# Patient Record
Sex: MALE | Race: OTHER | NOT HISPANIC OR LATINO
[De-identification: names, ages, dates, MRNs, and addresses within clinical notes are randomized per-mention and may not be internally consistent; named-entity substitution may affect disease eponyms.]

---

## 2017-09-27 ENCOUNTER — APPOINTMENT (OUTPATIENT)
Dept: SURGERY | Facility: CLINIC | Age: 42
End: 2017-09-27

## 2020-01-02 ENCOUNTER — APPOINTMENT (OUTPATIENT)
Dept: SURGERY | Facility: CLINIC | Age: 45
End: 2020-01-02
Payer: COMMERCIAL

## 2020-01-02 VITALS
TEMPERATURE: 98.4 F | WEIGHT: 260 LBS | SYSTOLIC BLOOD PRESSURE: 146 MMHG | HEART RATE: 87 BPM | OXYGEN SATURATION: 98 % | RESPIRATION RATE: 16 BRPM | BODY MASS INDEX: 33.37 KG/M2 | DIASTOLIC BLOOD PRESSURE: 86 MMHG | HEIGHT: 74 IN

## 2020-01-02 DIAGNOSIS — K62.5 HEMORRHAGE OF ANUS AND RECTUM: ICD-10-CM

## 2020-01-02 DIAGNOSIS — K64.1 SECOND DEGREE HEMORRHOIDS: ICD-10-CM

## 2020-01-02 PROCEDURE — 46600 DIAGNOSTIC ANOSCOPY SPX: CPT

## 2020-01-02 PROCEDURE — 99203 OFFICE O/P NEW LOW 30 MIN: CPT | Mod: 25

## 2020-01-02 NOTE — CONSULT LETTER
[Dear  ___] : Dear ~ROCIO, [Please see my note below.] : Please see my note below. [Consult Closing:] : Thank you very much for allowing me to participate in the care of this patient.  If you have any questions, please do not hesitate to contact me. [Consult Letter:] : I had the pleasure of evaluating your patient, [unfilled]. [Sincerely,] : Sincerely, [FreeTextEntry3] : Peter Araujo M.D., JENNY.JASON., F.SAPNA.S.LESLIERReS.\[facility] Chief Colorectal Clinical Services, [facility] [FreeTextEntry2] : Dr. Jennifer Vides [DrRe  ___] : Dr. MARC

## 2020-01-02 NOTE — PHYSICAL EXAM
[Normal Breath Sounds] : Normal breath sounds [Normal Rate and Rhythm] : normal rate and rhythm [Normal Heart Sounds] : normal heart sounds [No Edema] : No edema [No Rash or Lesion] : No rash or lesion [Oriented to Place] : oriented to place [Oriented to Person] : oriented to person [Alert] : alert [Calm] : calm [Oriented to Time] : oriented to time [Abdomen Tenderness] : ~T No ~M abdominal tenderness [JVD] : no jugular venous distention  [Abdomen Masses] : No abdominal masses [Wheezing] : no wheezing was heard [Thyroid] : the thyroid was abnormal [de-identified] : Full ROM [de-identified] : WNL [de-identified] : Appears well nourished [FreeTextEntry1] : Perianal inspection unremarkable. Large internal hemorrhoids noted on both digital exam and anoscopy. No sphincter spasm and no fissure noted.

## 2020-01-02 NOTE — HISTORY OF PRESENT ILLNESS
[FreeTextEntry1] : Colonoscopy from 1/24/18 demonstrated 6 mm polyp in the transverse colon. Pathology: mucosal neuroma. Internal hemorrhoids. Patient had RBL of the left lateral hemorrhoid on 4/9/12.\par Patient had a hard BM 2 weeks ago and experienced moderate amount of BRB with sharp pains. Pain and bleeding subsided in the past few days. Occasionally feels prolapsed rectal tissue with BMs that he can manually reduce. 1 hard-normal formed BM almost daily. Patient takes daily Metamucil which typically controls his hemorrhoid symptoms. Recently he stopped taking it.

## 2020-01-02 NOTE — ASSESSMENT
[FreeTextEntry1] : I have seen and evaluated patient and I have corroborated all nursing input into this note. Patient with recent episode of constipation after discontinuing daily Metamucil. He then had hemorrhoid swelling and bleeding. These symptoms have resolved since the patient restarted Metamucil. The patient will continue Metamucil daily and if he has recurrent symptoms he will return to my office for internal hemorrhoid rubber band ligation. Indications, risks, benefits, alternatives reviewed including but not limited to bleeding, infection, and failure. All questions answered.

## 2020-09-29 ENCOUNTER — APPOINTMENT (OUTPATIENT)
Dept: COLORECTAL SURGERY | Facility: CLINIC | Age: 45
End: 2020-09-29
Payer: COMMERCIAL

## 2020-09-29 VITALS
BODY MASS INDEX: 32.73 KG/M2 | RESPIRATION RATE: 15 BRPM | SYSTOLIC BLOOD PRESSURE: 118 MMHG | HEIGHT: 74 IN | OXYGEN SATURATION: 96 % | DIASTOLIC BLOOD PRESSURE: 80 MMHG | WEIGHT: 255 LBS | TEMPERATURE: 97.5 F | HEART RATE: 74 BPM

## 2020-09-29 PROCEDURE — 99203 OFFICE O/P NEW LOW 30 MIN: CPT

## 2020-09-29 NOTE — HISTORY OF PRESENT ILLNESS
[FreeTextEntry1] : Patient is a 44 yo WM here with complaints of an umbilical hernia.  he states that it happened about a month ago while straining for a bowel movement.  He states that he has issues with straining and usually takes a fiber supplement but had missed a few doses prior to this instance.  Currently denies abdominal pain or change in bowel habits. No lump or prolapse noted at hernia site. No nausea or vomiting. No aggravating factors

## 2020-09-29 NOTE — ASSESSMENT
[FreeTextEntry1] : Umbilical hernia\par -I discussed treatment options for the patient's umbilical hernia at length. This included observation and surgical repair. Given its symptoms have started, the natural history is likely progressive discomfort in the area. We discussed surgical repair of length including risks and benefits such as bleeding, infection and possible need for mesh implantation.\par -All questions were answered\par -Patient wishes to schedule and we'll do so at his earliest convenience

## 2020-09-29 NOTE — PHYSICAL EXAM
[Normal Breath Sounds] : Normal breath sounds [Normal Heart Sounds] : normal heart sounds [Normal Rate and Rhythm] : normal rate and rhythm [No Rash or Lesion] : No rash or lesion [Alert] : alert [Oriented to Person] : oriented to person [Oriented to Place] : oriented to place [Oriented to Time] : oriented to time [Calm] : calm [de-identified] : tender abdomen, Obese, +bs [de-identified] : well nourished male [de-identified] : NC/AT [de-identified] : SARAH/+ROM [de-identified] : Intact

## 2020-10-01 ENCOUNTER — OUTPATIENT (OUTPATIENT)
Dept: OUTPATIENT SERVICES | Facility: HOSPITAL | Age: 45
LOS: 1 days | End: 2020-10-01

## 2020-10-01 VITALS
HEIGHT: 74 IN | RESPIRATION RATE: 20 BRPM | HEART RATE: 70 BPM | TEMPERATURE: 97 F | WEIGHT: 250 LBS | SYSTOLIC BLOOD PRESSURE: 120 MMHG | OXYGEN SATURATION: 98 % | DIASTOLIC BLOOD PRESSURE: 80 MMHG

## 2020-10-01 DIAGNOSIS — Z98.890 OTHER SPECIFIED POSTPROCEDURAL STATES: Chronic | ICD-10-CM

## 2020-10-01 DIAGNOSIS — Z87.19 PERSONAL HISTORY OF OTHER DISEASES OF THE DIGESTIVE SYSTEM: ICD-10-CM

## 2020-10-01 DIAGNOSIS — K43.9 VENTRAL HERNIA WITHOUT OBSTRUCTION OR GANGRENE: ICD-10-CM

## 2020-10-01 DIAGNOSIS — Z01.818 ENCOUNTER FOR OTHER PREPROCEDURAL EXAMINATION: ICD-10-CM

## 2020-10-01 DIAGNOSIS — G47.33 OBSTRUCTIVE SLEEP APNEA (ADULT) (PEDIATRIC): ICD-10-CM

## 2020-10-01 RX ORDER — SODIUM CHLORIDE 9 MG/ML
1000 INJECTION, SOLUTION INTRAVENOUS
Refills: 0 | Status: DISCONTINUED | OUTPATIENT
Start: 2020-10-05 | End: 2020-10-20

## 2020-10-01 NOTE — H&P PST ADULT - NSICDXPROBLEM_GEN_ALL_CORE_FT
PROBLEM DIAGNOSES  Problem: H/O ventral hernia  Assessment and Plan: scheduled for ventral hernia repair with Dr. Middleton. on 10/05/2020.  Verbal and written pre-op instructions provided to patient. Patient verbalized understanding and will call surgeons office for revised instructions if surgery is rescheduled.   Pepcid for GI prophylaxis provided.   patient given verbal and written instruction with teach back on chlorhexidine shampoo, and the patient verbalized understanding with return demonstration.   Patient aware of need for COVID testing prior to  procedure and advised to coordinate with surgeon.   Patient will obtain medical clearance as per surgeons request-copy requested.        PROBLEM DIAGNOSES  Problem: H/O ventral hernia  Assessment and Plan: scheduled for ventral hernia repair with Dr. Middleton. on 10/05/2020.  Verbal and written pre-op instructions provided to patient. Patient verbalized understanding and will call surgeons office for revised instructions if surgery is rescheduled.   Pepcid for GI prophylaxis provided.   patient given verbal and written instruction with teach back on chlorhexidine shampoo, and the patient verbalized understanding with return demonstration.   Patient aware of need for COVID testing prior to  procedure and advised to coordinate with surgeon.   Patient will obtain medical clearance as per surgeons request-copy requested.     Problem: QASIM (obstructive sleep apnea)  Assessment and Plan: QASIM precautions-OR booking notified

## 2020-10-01 NOTE — H&P PST ADULT - RS GEN PE MLT RESP DETAILS PC
good air movement/airway patent/clear to auscultation bilaterally/no wheezes/breath sounds equal/respirations non-labored

## 2020-10-01 NOTE — H&P PST ADULT - ASSESSMENT
44 yo male with no significant medical history presents to PST unit with pre-op diagnosis of ventral hernia without obstruction or gangrene scheduled for ventral hernia repair with Dr. Middleton.

## 2020-10-01 NOTE — H&P PST ADULT - NSANTHOSAYNRD_GEN_A_CORE
No. QASIM screening performed.  STOP BANG Legend: 0-2 = LOW Risk; 3-4 = INTERMEDIATE Risk; 5-8 = HIGH Risk

## 2020-10-02 ENCOUNTER — APPOINTMENT (OUTPATIENT)
Dept: DISASTER EMERGENCY | Facility: CLINIC | Age: 45
End: 2020-10-02

## 2020-10-03 LAB — SARS-COV-2 N GENE NPH QL NAA+PROBE: NOT DETECTED

## 2020-10-04 ENCOUNTER — TRANSCRIPTION ENCOUNTER (OUTPATIENT)
Age: 45
End: 2020-10-04

## 2020-10-05 ENCOUNTER — OUTPATIENT (OUTPATIENT)
Dept: OUTPATIENT SERVICES | Facility: HOSPITAL | Age: 45
LOS: 1 days | Discharge: HOME | End: 2020-10-05
Payer: COMMERCIAL

## 2020-10-05 ENCOUNTER — APPOINTMENT (OUTPATIENT)
Dept: COLORECTAL SURGERY | Facility: HOSPITAL | Age: 45
End: 2020-10-05

## 2020-10-05 VITALS
DIASTOLIC BLOOD PRESSURE: 70 MMHG | RESPIRATION RATE: 17 BRPM | OXYGEN SATURATION: 99 % | HEART RATE: 74 BPM | SYSTOLIC BLOOD PRESSURE: 116 MMHG

## 2020-10-05 VITALS
DIASTOLIC BLOOD PRESSURE: 80 MMHG | RESPIRATION RATE: 12 BRPM | HEIGHT: 74 IN | HEART RATE: 75 BPM | OXYGEN SATURATION: 98 % | SYSTOLIC BLOOD PRESSURE: 121 MMHG | TEMPERATURE: 98 F | WEIGHT: 255.07 LBS

## 2020-10-05 DIAGNOSIS — K43.9 VENTRAL HERNIA WITHOUT OBSTRUCTION OR GANGRENE: ICD-10-CM

## 2020-10-05 DIAGNOSIS — Z98.890 OTHER SPECIFIED POSTPROCEDURAL STATES: Chronic | ICD-10-CM

## 2020-10-05 PROCEDURE — 49585: CPT

## 2020-10-05 RX ORDER — FENTANYL CITRATE 50 UG/ML
25 INJECTION INTRAVENOUS
Refills: 0 | Status: DISCONTINUED | OUTPATIENT
Start: 2020-10-05 | End: 2020-10-05

## 2020-10-05 RX ORDER — OXYCODONE HYDROCHLORIDE 5 MG/1
1 TABLET ORAL
Qty: 10 | Refills: 0
Start: 2020-10-05

## 2020-10-05 RX ORDER — ONDANSETRON 8 MG/1
4 TABLET, FILM COATED ORAL ONCE
Refills: 0 | Status: COMPLETED | OUTPATIENT
Start: 2020-10-05 | End: 2020-10-05

## 2020-10-05 RX ORDER — OXYCODONE HYDROCHLORIDE 5 MG/1
5 TABLET ORAL ONCE
Refills: 0 | Status: DISCONTINUED | OUTPATIENT
Start: 2020-10-05 | End: 2020-10-05

## 2020-10-05 RX ADMIN — FENTANYL CITRATE 25 MICROGRAM(S): 50 INJECTION INTRAVENOUS at 20:21

## 2020-10-05 RX ADMIN — SODIUM CHLORIDE 30 MILLILITER(S): 9 INJECTION, SOLUTION INTRAVENOUS at 13:50

## 2020-10-05 RX ADMIN — FENTANYL CITRATE 25 MICROGRAM(S): 50 INJECTION INTRAVENOUS at 20:06

## 2020-10-05 RX ADMIN — ONDANSETRON 4 MILLIGRAM(S): 8 TABLET, FILM COATED ORAL at 19:46

## 2020-10-05 NOTE — ASU DISCHARGE PLAN (ADULT/PEDIATRIC) - ASU DC SPECIAL INSTRUCTIONSFT
You may take Tylenol and Ibuprofen, alternating around the clock as needed. 10 tablets of oxycodone have been sent to your pharmacy for breakthrough pain.    Please follow-up in the office with Dr. Middleton in 10 days

## 2020-10-05 NOTE — ASU DISCHARGE PLAN (ADULT/PEDIATRIC) - CALL YOUR DOCTOR IF YOU HAVE ANY OF THE FOLLOWING:
Inability to tolerate liquids or foods/Nausea and vomiting that does not stop/Pain not relieved by Medications/Bleeding that does not stop/Wound/Surgical Site with redness, or foul smelling discharge or pus

## 2020-10-05 NOTE — ASU PREOP CHECKLIST - ASSESSMENT, HISTORY & PHYSICAL COMPLETED AND ON MEDICAL RECORD
302 faxed to [facility] for review at [facility]       FRED Anton  08/28/20   [id number] done

## 2020-10-05 NOTE — ASU DISCHARGE PLAN (ADULT/PEDIATRIC) - CARE PROVIDER_API CALL
Quang Middleton  COLON/RECTAL SURGERY  [facility], [address] [place] [id number]  Phone: [phone]  Fax: [phone]  Follow Up Time: 1 week

## 2020-10-05 NOTE — BRIEF OPERATIVE NOTE - OPERATION/FINDINGS
Repair of small reducible umbilical hernia with prolene suture in a figure of eight fashion. Hemostasis achieved

## 2020-10-07 PROBLEM — Z87.19 PERSONAL HISTORY OF OTHER DISEASES OF THE DIGESTIVE SYSTEM: Chronic | Status: ACTIVE | Noted: 2020-10-01

## 2020-10-14 ENCOUNTER — APPOINTMENT (OUTPATIENT)
Dept: COLORECTAL SURGERY | Facility: CLINIC | Age: 45
End: 2020-10-14
Payer: COMMERCIAL

## 2020-10-14 VITALS
TEMPERATURE: 97.7 F | HEART RATE: 76 BPM | DIASTOLIC BLOOD PRESSURE: 75 MMHG | SYSTOLIC BLOOD PRESSURE: 118 MMHG | WEIGHT: 255 LBS | HEIGHT: 74 IN | BODY MASS INDEX: 32.73 KG/M2

## 2020-10-14 PROCEDURE — 99024 POSTOP FOLLOW-UP VISIT: CPT

## 2020-10-14 NOTE — HISTORY OF PRESENT ILLNESS
[FreeTextEntry1] : s/p umbilical hernia repair.  pt progressing well.  mild soreness.  denies pain. otherwise w/o complaint

## 2020-10-14 NOTE — ASSESSMENT
[FreeTextEntry1] : umbilical hernia\par -s/p repair, pt progressing well\par -no heavy lifting for 6 weeks\par -f/u in 4 weeks for wound check

## 2020-11-03 ENCOUNTER — APPOINTMENT (OUTPATIENT)
Dept: COLORECTAL SURGERY | Facility: CLINIC | Age: 45
End: 2020-11-03
Payer: COMMERCIAL

## 2020-11-03 DIAGNOSIS — K42.9 UMBILICAL HERNIA W/OUT OBSTRUCTION OR GANGRENE: ICD-10-CM

## 2020-11-03 PROCEDURE — 99024 POSTOP FOLLOW-UP VISIT: CPT

## 2020-11-03 NOTE — ASSESSMENT
[FreeTextEntry1] : Umbilical hernia\par -Patient progressing well\par -No signs of recurrent hernia\par -I once again stressed to the patient to decrease lifting for the next 6-8 weeks\par -Patient to followup as needed

## 2020-11-03 NOTE — HISTORY OF PRESENT ILLNESS
[FreeTextEntry1] : Status post umbilical hernia repair. Patient progressed well. Reports some discomfort in the area with straining and lifting. Denies fevers or chills denies prolapse. No nausea or vomiting tolerating diet

## 2021-01-21 ENCOUNTER — APPOINTMENT (OUTPATIENT)
Dept: COLORECTAL SURGERY | Facility: CLINIC | Age: 46
End: 2021-01-21

## 2022-09-14 ENCOUNTER — APPOINTMENT (OUTPATIENT)
Dept: PULMONOLOGY | Facility: CLINIC | Age: 47
End: 2022-09-14

## 2022-09-14 VITALS
DIASTOLIC BLOOD PRESSURE: 79 MMHG | HEART RATE: 53 BPM | BODY MASS INDEX: 34.02 KG/M2 | OXYGEN SATURATION: 96 % | SYSTOLIC BLOOD PRESSURE: 114 MMHG | WEIGHT: 265 LBS | TEMPERATURE: 97.6 F

## 2022-09-14 LAB — POCT - HEMOGLOBIN (HGB), QUANTITATIVE, TRANSCUTANEOUS: 16.1

## 2022-09-14 PROCEDURE — 88738 HGB QUANT TRANSCUTANEOUS: CPT

## 2022-09-14 PROCEDURE — 94010 BREATHING CAPACITY TEST: CPT

## 2022-09-14 PROCEDURE — 94729 DIFFUSING CAPACITY: CPT

## 2022-09-14 PROCEDURE — 99204 OFFICE O/P NEW MOD 45 MIN: CPT | Mod: 25

## 2022-09-14 PROCEDURE — 94727 GAS DIL/WSHOT DETER LNG VOL: CPT

## 2022-09-14 RX ORDER — OMEPRAZOLE 40 MG/1
40 CAPSULE, DELAYED RELEASE ORAL
Refills: 0 | Status: ACTIVE | COMMUNITY

## 2022-09-14 RX ORDER — ATORVASTATIN CALCIUM 80 MG/1
TABLET, FILM COATED ORAL
Refills: 0 | Status: ACTIVE | COMMUNITY

## 2022-09-14 NOTE — REASON FOR VISIT
[Consultation] : a consultation [Chest Pain] : chest pain [Shortness of Breath] : shortness of breath

## 2022-09-14 NOTE — HISTORY OF PRESENT ILLNESS
[Never] : never [TextBox_4] : 47M no prior pmhx\par \par sudden onset episodes of sob, sweating \par went to [facility] twice, ct cornaries normal, angio normal, ddimer negative\par put on atorvastatin and aspirin\par he is still experiencing episodes of sudden onset sweating and dyspnea, lasts for a few min then subsides.\par no prior pulm hx\par no smoking/etoh/drugs\par exercises regularly and never had this issue

## 2022-09-14 NOTE — ASSESSMENT
[FreeTextEntry1] : would definitively r/o PE with CTA\par \par if normal would consider endo work up, pheo?\par \par Total encounter time 30 minutes.\par

## 2022-09-14 NOTE — PROCEDURE
[FreeTextEntry1] : PFT: Normal spirometry.  Lung volumes normal. DLCO normal.\par \par \par [facility] records reviewed on pts phone ---ct coronaries, cardiac cath, labs, all normal, cxr clear

## 2022-09-14 NOTE — CONSULT LETTER
[FreeTextEntry1] : Dear ,\par \par I had the pleasure of evaluating your patient, LES TATE today in pulmonary consultation.  Please refer to my attached note for my findings and recommendations.\par \par \par Thank you for allowing me to participate in the care of your patient, please feel free to call with any questions or concerns.\par \par \par Sincerely,\par \par Ayaka Aceves MD\par [facility] \par [facility]\par \par

## 2022-09-15 ENCOUNTER — OUTPATIENT (OUTPATIENT)
Dept: OUTPATIENT SERVICES | Facility: HOSPITAL | Age: 47
LOS: 1 days | End: 2022-09-15
Payer: COMMERCIAL

## 2022-09-15 ENCOUNTER — APPOINTMENT (OUTPATIENT)
Dept: CT IMAGING | Facility: IMAGING CENTER | Age: 47
End: 2022-09-15

## 2022-09-15 DIAGNOSIS — Z98.890 OTHER SPECIFIED POSTPROCEDURAL STATES: Chronic | ICD-10-CM

## 2022-09-15 DIAGNOSIS — R06.00 DYSPNEA, UNSPECIFIED: ICD-10-CM

## 2022-09-15 PROCEDURE — 71275 CT ANGIOGRAPHY CHEST: CPT | Mod: 26

## 2022-09-15 PROCEDURE — 71275 CT ANGIOGRAPHY CHEST: CPT

## 2022-09-16 ENCOUNTER — APPOINTMENT (OUTPATIENT)
Dept: PULMONOLOGY | Facility: CLINIC | Age: 47
End: 2022-09-16

## 2022-09-16 DIAGNOSIS — R06.00 DYSPNEA, UNSPECIFIED: ICD-10-CM

## 2022-09-16 PROCEDURE — 99213 OFFICE O/P EST LOW 20 MIN: CPT | Mod: 95

## 2022-09-16 NOTE — PROCEDURE
[FreeTextEntry1] : \par \par EXAM: [id number] - CT ANGIO CHEST PULM [facility] - ORDERED BY: YI MAC\par \par \par PROCEDURE DATE: 09/15/2022\par \par \par \par INTERPRETATION: INDICATION: Sudden onset of shortness of breath, rule out pulmonary embolism\par \par TECHNIQUE: Volumetric images of the chest were obtained after the administration of 80 mL of Omnipaque 350. Maximum intensity projection images were generated.\par \par COMPARISON: None.\par \par FINDINGS:\par \par PULMONARY ANGIOGRAM: No pulmonary embolism.\par \par LUNGS/AIRWAYS/PLEURA: Patent trachea and bronchi. Clear lungs. No pleural effusion.\par \par LYMPH NODES/MEDIASTINUM: No enlarged lymph nodes.\par \par HEART/VASCULATURE: Normal heart size. Unremarkable pericardium. Normal caliber aorta.\par \par UPPER ABDOMEN: Small hepatic cyst.\par \par BONES/SOFT TISSUES: Unremarkable.\par \par \par IMPRESSION:\par \par No pulmonary embolism.\par \par --- End of Report ---\par \par \par Reviewed:\par \par \par PFT: Normal spirometry.  Lung volumes normal. DLCO normal.\par \par \par [facility] records reviewed on pts phone ---ct coronaries, cardiac cath, labs, all normal, cxr clear

## 2022-09-16 NOTE — REASON FOR VISIT
[Home] : at home, [unfilled] , at the time of the visit. [Medical Office: ([facility])___] : at the medical office located in  [Patient] : the patient

## 2022-09-16 NOTE — ASSESSMENT
[FreeTextEntry1] : unclear etiology of these intermittent episodes of dyspnea and sweating\par consider endocrine eval\par doesn't sound like he is deconditioned bc states he works out regularly and this is new symptom.\par fu after discussion with PCP

## 2022-09-26 ENCOUNTER — APPOINTMENT (OUTPATIENT)
Dept: PULMONOLOGY | Facility: CLINIC | Age: 47
End: 2022-09-26

## 2022-12-07 ENCOUNTER — APPOINTMENT (OUTPATIENT)
Dept: NEUROLOGY | Facility: CLINIC | Age: 47
End: 2022-12-07

## 2022-12-07 VITALS
SYSTOLIC BLOOD PRESSURE: 121 MMHG | HEART RATE: 69 BPM | BODY MASS INDEX: 31.7 KG/M2 | DIASTOLIC BLOOD PRESSURE: 81 MMHG | WEIGHT: 247 LBS | HEIGHT: 74 IN

## 2022-12-07 DIAGNOSIS — R47.81 SLURRED SPEECH: ICD-10-CM

## 2022-12-07 DIAGNOSIS — R26.89 OTHER ABNORMALITIES OF GAIT AND MOBILITY: ICD-10-CM

## 2022-12-07 PROCEDURE — 99205 OFFICE O/P NEW HI 60 MIN: CPT

## 2022-12-08 ENCOUNTER — APPOINTMENT (OUTPATIENT)
Dept: NEUROLOGY | Facility: CLINIC | Age: 47
End: 2022-12-08

## 2022-12-08 PROCEDURE — 95911 NRV CNDJ TEST 9-10 STUDIES: CPT

## 2022-12-08 PROCEDURE — 95886 MUSC TEST DONE W/N TEST COMP: CPT

## 2024-03-11 ENCOUNTER — APPOINTMENT (OUTPATIENT)
Dept: ORTHOPEDIC SURGERY | Facility: CLINIC | Age: 49
End: 2024-03-11

## 2024-05-12 NOTE — H&P PST ADULT - LYMPH NODES
Problem: Discharge Planning  Goal: Discharge to home or other facility with appropriate resources  5/11/2024 2132 by Gisele Kim RN  Outcome: Progressing  5/11/2024 2132 by Gisele Kim RN  Outcome: Progressing     Problem: Pain  Goal: Verbalizes/displays adequate comfort level or baseline comfort level  5/11/2024 2132 by Gisele Kim RN  Outcome: Progressing  5/11/2024 2132 by Gisele Kim RN  Outcome: Progressing     Problem: Safety - Adult  Goal: Free from fall injury  5/11/2024 2132 by Gisele Kim RN  Outcome: Progressing  5/11/2024 2132 by Gisele Kim RN  Outcome: Progressing     Problem: ABCDS Injury Assessment  Goal: Absence of physical injury  5/11/2024 2132 by Gisele Kim RN  Outcome: Progressing  5/11/2024 2132 by Gisele Kim RN  Outcome: Progressing     Problem: Skin/Tissue Integrity  Goal: Absence of new skin breakdown  Description: 1.  Monitor for areas of redness and/or skin breakdown  2.  Assess vascular access sites hourly  3.  Every 4-6 hours minimum:  Change oxygen saturation probe site  4.  Every 4-6 hours:  If on nasal continuous positive airway pressure, respiratory therapy assess nares and determine need for appliance change or resting period.  5/11/2024 2132 by Gisele Kim RN  Outcome: Progressing  5/11/2024 2132 by Gisele Kim RN  Outcome: Progressing     Problem: Chronic Conditions and Co-morbidities  Goal: Patient's chronic conditions and co-morbidity symptoms are monitored and maintained or improved  5/11/2024 2132 by Gisele Kim RN  Outcome: Progressing  5/11/2024 2132 by Gisele Kim RN  Outcome: Progressing      detailed exam

## 2024-11-13 NOTE — ASU PATIENT PROFILE, ADULT - PSH
..Status post LASIK surgery of both eyes